# Patient Record
Sex: FEMALE | Race: WHITE
[De-identification: names, ages, dates, MRNs, and addresses within clinical notes are randomized per-mention and may not be internally consistent; named-entity substitution may affect disease eponyms.]

---

## 2020-06-24 ENCOUNTER — HOSPITAL ENCOUNTER (EMERGENCY)
Dept: HOSPITAL 76 - ED | Age: 25
Discharge: HOME | End: 2020-06-24
Payer: COMMERCIAL

## 2020-06-24 VITALS — DIASTOLIC BLOOD PRESSURE: 63 MMHG | SYSTOLIC BLOOD PRESSURE: 118 MMHG

## 2020-06-24 DIAGNOSIS — H10.32: Primary | ICD-10-CM

## 2020-06-24 DIAGNOSIS — H11.422: ICD-10-CM

## 2020-06-24 PROCEDURE — 99283 EMERGENCY DEPT VISIT LOW MDM: CPT

## 2020-06-24 PROCEDURE — 99282 EMERGENCY DEPT VISIT SF MDM: CPT

## 2020-06-24 NOTE — ED PHYSICIAN DOCUMENTATION
PD HPI OPHTHO





- Stated complaint


Stated Complaint: L EYE REDNESS, SWELLING





- Chief complaint


Chief Complaint: Heent





- History obtained from


History obtained from: Patient





- Additional information


Additional information: 





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 

she was working outside and abruptly developed redness and swelling of the Left 

eye.  She did not think her vision was affected but nurse note made of the 

nursing notes showing an acuity in the left of 20/300





Review of Systems


Constitutional: denies: Fever, Chills


Nose: denies: Rhinorrhea / runny nose, Congestion


Throat: denies: Sore throat


Cardiac: denies: Chest pain / pressure, Palpitations


Respiratory: denies: Dyspnea, Cough





PD PAST MEDICAL HISTORY





- Past Medical History


Past Medical History: Yes


Psych: Depression, Anxiety





- Past Surgical History


Past Surgical History: No





- Present Medications


Home Medications: 


                                Ambulatory Orders











 Medication  Instructions  Recorded  Confirmed


 


Sertraline [Zoloft] 100 mg PO DAILY 06/24/20 06/24/20


 


Tobramycin/Dexamethasone [Tobradex 1 drops OP QID #1 drops.susp 06/24/20 





Eye Drops]   














- Social History


Does the pt smoke?: No


Smoking Status: Never smoker


Does the pt drink ETOH?: No


Does the pt have substance abuse?: No





- Immunizations


Immunizations are current?: Yes





PD ED PE NORMAL





- Vitals


Vital signs reviewed: Yes





- General


General: Alert and oriented X 3, No acute distress





- HEENT


HEENT: PERRL, Other (She has significant conjunctivitis of the left eye with 

chemosis and mild discharge.)





- Neuro


Neuro: Alert and oriented X 3, Normal speech





Results





- Vitals


Vitals: 


                               Vital Signs - 24 hr











  06/24/20





  11:48


 


Temperature 36.5 C


 


Heart Rate 80


 


Respiratory 18





Rate 


 


Blood Pressure 118/63


 


O2 Saturation 98








                                     Oxygen











O2 Source                      Room air

















PD MEDICAL DECISION MAKING





- ED course


ED course: 





Patient with likely allergic conjunctivitis, it started fairly suddenly and 

while outside.  Her vision is only affected I think because of the volume of 

chemosis.





Departure





- Departure


Disposition: 01 Home, Self Care


Clinical Impression: 


Conjunctivitis


Qualifiers:


 Conjunctivitis type: acute Acute conjunctivitis type: unspecified Laterality: 

left Qualified Code(s): H10.32 - Unspecified acute conjunctivitis, left eye





Condition: Good


Record reviewed to determine appropriate education?: Yes


Instructions:  ED Conjunctivitis Nonspecific


Follow-Up: 


Jesus Ang MD [Provider Admit Priv/Credential] - 


Prescriptions: 


Tobramycin/Dexamethasone [Tobradex Eye Drops] 1 drops OP QID #1 drops.susp


Comments: 


Return if worse, follow-up with the eye doctor Friday if not better.


Do not use the eyedrops for more than a week.

## 2022-01-26 ENCOUNTER — HOSPITAL ENCOUNTER (EMERGENCY)
Dept: HOSPITAL 76 - ED | Age: 27
Discharge: LEFT BEFORE DISCHARGE | End: 2022-01-26
Payer: COMMERCIAL

## 2022-01-26 VITALS — DIASTOLIC BLOOD PRESSURE: 66 MMHG | SYSTOLIC BLOOD PRESSURE: 99 MMHG

## 2022-01-26 DIAGNOSIS — O21.0: Primary | ICD-10-CM

## 2022-01-26 DIAGNOSIS — Z3A.01: ICD-10-CM

## 2022-01-26 LAB
ALBUMIN DIAFP-MCNC: 4.5 G/DL (ref 3.2–5.5)
ALBUMIN/GLOB SERPL: 1.2 {RATIO} (ref 1–2.2)
ALP SERPL-CCNC: 73 IU/L (ref 42–121)
ALT SERPL W P-5'-P-CCNC: 21 IU/L (ref 10–60)
ANION GAP SERPL CALCULATED.4IONS-SCNC: 11 MMOL/L (ref 6–13)
AST SERPL W P-5'-P-CCNC: 20 IU/L (ref 10–42)
BASOPHILS NFR BLD AUTO: 0 10^3/UL (ref 0–0.1)
BASOPHILS NFR BLD AUTO: 0.3 %
BILIRUB BLD-MCNC: 0.7 MG/DL (ref 0.2–1)
BUN SERPL-MCNC: 11 MG/DL (ref 6–20)
CALCIUM UR-MCNC: 9.7 MG/DL (ref 8.5–10.3)
CHLORIDE SERPL-SCNC: 102 MMOL/L (ref 101–111)
CO2 SERPL-SCNC: 20 MMOL/L (ref 21–32)
CREAT SERPLBLD-SCNC: 0.6 MG/DL (ref 0.4–1)
EOSINOPHIL # BLD AUTO: 0 10^3/UL (ref 0–0.7)
EOSINOPHIL NFR BLD AUTO: 0.1 %
ERYTHROCYTE [DISTWIDTH] IN BLOOD BY AUTOMATED COUNT: 13.3 % (ref 12–15)
GFRSERPLBLD MDRD-ARVRAT: 122 ML/MIN/{1.73_M2} (ref 89–?)
GLOBULIN SER-MCNC: 3.8 G/DL (ref 2.1–4.2)
GLUCOSE SERPL-MCNC: 121 MG/DL (ref 70–100)
HCT VFR BLD AUTO: 34.4 % (ref 37–47)
HGB UR QL STRIP: 12 G/DL (ref 12–16)
LIPASE SERPL-CCNC: 37 U/L (ref 22–51)
LYMPHOCYTES # SPEC AUTO: 3.1 10^3/UL (ref 1.5–3.5)
LYMPHOCYTES NFR BLD AUTO: 28.2 %
MCH RBC QN AUTO: 27.9 PG (ref 27–31)
MCHC RBC AUTO-ENTMCNC: 34.9 G/DL (ref 32–36)
MCV RBC AUTO: 80 FL (ref 81–99)
MONOCYTES # BLD AUTO: 0.8 10^3/UL (ref 0–1)
MONOCYTES NFR BLD AUTO: 7 %
NEUTROPHILS # BLD AUTO: 6.9 10^3/UL (ref 1.5–6.6)
NEUTROPHILS # SNV AUTO: 10.8 X10^3/UL (ref 4.8–10.8)
NEUTROPHILS NFR BLD AUTO: 64.1 %
NRBC # BLD AUTO: 0 /100WBC
NRBC # BLD AUTO: 0 X10^3/UL
PDW BLD AUTO: 8.8 FL (ref 7.9–10.8)
PLATELET # BLD: 383 10^3/UL (ref 130–450)
POTASSIUM SERPL-SCNC: 3.2 MMOL/L (ref 3.5–5)
PROT SPEC-MCNC: 8.3 G/DL (ref 6.7–8.2)
RBC MAR: 4.3 10^6/UL (ref 4.2–5.4)
SODIUM SERPLBLD-SCNC: 133 MMOL/L (ref 135–145)

## 2022-01-26 PROCEDURE — 96375 TX/PRO/DX INJ NEW DRUG ADDON: CPT

## 2022-01-26 PROCEDURE — 83690 ASSAY OF LIPASE: CPT

## 2022-01-26 PROCEDURE — 99284 EMERGENCY DEPT VISIT MOD MDM: CPT

## 2022-01-26 PROCEDURE — 96374 THER/PROPH/DIAG INJ IV PUSH: CPT

## 2022-01-26 PROCEDURE — 99282 EMERGENCY DEPT VISIT SF MDM: CPT

## 2022-01-26 PROCEDURE — 80053 COMPREHEN METABOLIC PANEL: CPT

## 2022-01-26 PROCEDURE — 36415 COLL VENOUS BLD VENIPUNCTURE: CPT

## 2022-01-26 PROCEDURE — 76801 OB US < 14 WKS SINGLE FETUS: CPT

## 2022-01-26 PROCEDURE — 85025 COMPLETE CBC W/AUTO DIFF WBC: CPT

## 2022-01-26 NOTE — ULTRASOUND REPORT
PROCEDURE:  OB First Trimester

 

INDICATIONS:  PELVIC PAIN, 8 WEEK PREG

 

OUTSIDE/PRIOR DATING DATA:  

Last menstrual period (LMP): 12/7/2021.  

LMP-based estimated date of delivery (AME): 9/13/2022.  

First dating scan (date and location): 1/26/2022.  

Estimated date of delivery (AME) from first dating scan: 9/16/2022.  

 

 

TECHNIQUE:  

Real-time scanning was performed of the fetus and maternal pelvic organs, with image documentation.  


 

COMPARISON:  None

 

FINDINGS:     

 

Embryo:  Single live intrauterine pregnancy is identified with crown-rump length measuring 0.8 cm cor
responding to 6 weeks 5 days. No subchorionic hemorrhage.

Heart rate: 123 bpm

 

Measurement variability in dating:  +/- 4 weeks by LMP, +/- 7 days by mean sac diameter (use before 6
 weeks gestation if crown-rump length not able to be measured), +/- 5 days by crown-rump length (6-12
 weeks gestation).  

 

Maternal organs:  Ovaries demonstrate a left corpus luteal cyst..  

 

IMPRESSION:  

Single live intrauterine pregnancy with ultrasound gestational age of 6 weeks 5 days.

 

Recommend follow-up imaging at 20-22 weeks for dates and anatomy.

 

Reviewed by: Stephanie Alvarez MD on 1/26/2022 10:05 PM PST

Approved by: Stephanie Alvarez MD on 1/26/2022 10:05 PM PST

 

 

Station ID:  [id number]

## 2022-01-26 NOTE — ED PHYSICIAN DOCUMENTATION
History of Present Illness





- Stated complaint


Stated Complaint: VOMITING BLOOD





- Chief complaint


Chief Complaint: Abd Pain





- History obtained from


History obtained from: Patient





- History of Present Illness


Timing: Today


Pain level max: 2


Pain level now: 2





- Additonal information


Additional information: 





Patient is a 26-year-old female who presents to the emergency department stating

she is about 8 weeks pregnant.  She states she had nausea and vomiting starting 

this morning.  She states that there was blood in the vomit so came in for 

evaluation.  She states that she has mild crampy diffuse abdominal pain.  

Nothing makes it better or worse.  Took Zofran without relief.  No fevers.  No 

chills.  No coughing.  No congestion.  No Covid exposures.





Review of Systems


Ten Systems: 10 systems reviewed and negative


Constitutional: denies: Fever, Chills


Respiratory: denies: Cough


GI: reports: Nausea, Vomiting.  denies: Diarrhea, Bloody / black stool


: denies: Dysuria, Frequency, Hesitancy


Skin: denies: Rash


Musculoskeletal: denies: Neck pain, Back pain


Neurologic: denies: Headache





PD PAST MEDICAL HISTORY





- Past Medical History


Past Medical History: Yes


Cardiovascular: None


Respiratory: None


Neuro: None


Endocrine/Autoimmune: None


GI: None


GYN: None


: None


HEENT: None


Psych: Depression, Anxiety


Musculoskeletal: None


Derm: Other drug resistant infections





- Past Surgical History


Past Surgical History: No





- Present Medications


Home Medications: 


                                Ambulatory Orders











 Medication  Instructions  Recorded  Confirmed


 


Sertraline [Zoloft] 100 mg PO DAILY 06/24/20 01/26/22














- Allergies


Allergies/Adverse Reactions: 


                                    Allergies











Allergy/AdvReac Type Severity Reaction Status Date / Time


 


No Known Drug Allergies Allergy   Verified 01/26/22 18:25














- Social History


Does the pt smoke?: No


Smoking Status: Never smoker


Does the pt drink ETOH?: No


Does the pt have substance abuse?: No





- Immunizations


Immunizations are current?: Yes





- POLST


Patient has POLST: No





PD ED PE NORMAL





- Vitals


Vital signs reviewed: Yes





- General


General: Alert and oriented X 3, No acute distress, Well developed/nourished





- HEENT


HEENT: PERRL, Moist mucous membranes





- Neck


Neck: Supple, no meningeal sign





- Cardiac


Cardiac: RRR, Strong equal pulses





- Respiratory


Respiratory: No respiratory distress, Clear bilaterally





- Abdomen


Abdomen: Soft, Non tender, Non distended





- Back


Back: No CVA TTP, No spinal TTP





- Derm


Derm: Warm and dry





- Extremities


Extremities: No edema, No calf tenderness / cord





- Neuro


Neuro: Alert and oriented X 3





- Psych


Psych: Normal mood, Normal affect





Results





- Vitals


Vitals: 


                               Vital Signs - 24 hr











  01/26/22 01/26/22 01/26/22





  18:25 20:48 20:59


 


Temperature 36.5 C  


 


Heart Rate 110 H 95 79


 


Respiratory 20 17 16





Rate   


 


Blood Pressure 138/80 H 145/90 H 112/83 H


 


O2 Saturation 100 100 100














  01/26/22





  22:00


 


Temperature 


 


Heart Rate 69


 


Respiratory 16





Rate 


 


Blood Pressure 99/66


 


O2 Saturation 99








                                     Oxygen











O2 Source                      Room air

















- Labs


Labs: 


                                Laboratory Tests











  01/26/22 01/26/22





  19:02 19:02


 


WBC  10.8 


 


RBC  4.30 


 


Hgb  12.0 


 


Hct  34.4 L 


 


MCV  80.0 L 


 


MCH  27.9 


 


MCHC  34.9 


 


RDW  13.3 


 


Plt Count  383 


 


MPV  8.8 


 


Neut # (Auto)  6.9 H 


 


Lymph # (Auto)  3.1 


 


Mono # (Auto)  0.8 


 


Eos # (Auto)  0.0 


 


Baso # (Auto)  0.0 


 


Absolute Nucleated RBC  0.00 


 


Nucleated RBC %  0.0 


 


Sodium   133 L


 


Potassium   3.2 L


 


Chloride   102


 


Carbon Dioxide   20 L


 


Anion Gap   11.0


 


BUN   11


 


Creatinine   0.6


 


Estimated GFR (MDRD)   122


 


Glucose   121 H


 


Calcium   9.7


 


Total Bilirubin   0.7


 


AST   20


 


ALT   21


 


Alkaline Phosphatase   73


 


Total Protein   8.3 H


 


Albumin   4.5


 


Globulin   3.8


 


Albumin/Globulin Ratio   1.2


 


Lipase   37














- Rads (name of study)


  ** OB ultrasound


Radiology: Final report received, EMP read contemporaneously, See rad report 

(Intrauterine pregnancy, 6 weeks 5 days.  No evidence of ectopic)





PD MEDICAL DECISION MAKING





- ED course


Complexity details: reviewed results, re-evaluated patient, considered 

differential, d/w patient


ED course: 





26-year-old female with vomiting during pregnancy.  Ultrasound reveals an 

intrauterine pregnancy, 6 weeks 5 days.  The patient pulled out her IV and 

demanded to leave before her work-up was complete and the ultrasound was read.  

The patient left AGAINST MEDICAL ADVICE.  I did not have a chance to speak to 

the patient before she eloped.





This document was made in part using voice recognition software. While efforts 

are made to proofread this document, sound alike and grammatical errors may 

occur.





Departure





- Departure


Disposition: 07 Against Medical Advice


Clinical Impression: 


Vomiting


Qualifiers:


 Vomiting type: unspecified Nausea presence: with nausea Qualified Code(s): 

R11.2 - Nausea with vomiting, unspecified





Pregnancy


Qualifiers:


 Weeks of gestation: less than 8 weeks Qualified Code(s): Z3A.01 - Less than 8 

weeks gestation of pregnancy





Condition: Stable


Discharge Date/Time: 01/26/22 22:05